# Patient Record
Sex: MALE | Race: NATIVE HAWAIIAN OR OTHER PACIFIC ISLANDER
[De-identification: names, ages, dates, MRNs, and addresses within clinical notes are randomized per-mention and may not be internally consistent; named-entity substitution may affect disease eponyms.]

---

## 2018-11-29 ENCOUNTER — HOSPITAL ENCOUNTER (EMERGENCY)
Dept: HOSPITAL 31 - C.ER | Age: 33
LOS: 1 days | Discharge: HOME | End: 2018-11-30
Payer: SELF-PAY

## 2018-11-29 VITALS — TEMPERATURE: 98.3 F

## 2018-11-29 VITALS — RESPIRATION RATE: 18 BRPM

## 2018-11-29 DIAGNOSIS — H66.92: Primary | ICD-10-CM

## 2018-11-29 DIAGNOSIS — I10: ICD-10-CM

## 2018-11-30 VITALS — SYSTOLIC BLOOD PRESSURE: 201 MMHG | DIASTOLIC BLOOD PRESSURE: 129 MMHG | HEART RATE: 85 BPM

## 2018-11-30 VITALS — OXYGEN SATURATION: 100 %

## 2018-11-30 NOTE — C.PDOC
History Of Present Illness


33 year old male presents to the ED c/o left ear pain associated with purulent 

discharge after cleaning with a Q-tip x 1 week. Patient denies fever, chills, 

headache, dizziness, nausea, vomit, diarrhea, injury, fall, trauma.


Time Seen by Provider: 11/29/18 23:13


Chief Complaint (Nursing): ENT Problem


History Per: Patient


History/Exam Limitations: None


Onset/Duration Of Symptoms: Days


Current Symptoms Are (Timing): Still Present


Quality (Ear): Pain W/Touch, Discharge


Anticoagulant/Antiplatlet Use?: No


Recent Aspirin Use: No





Past Medical History


Reviewed: Historical Data, Nursing Documentation, Vital Signs


Vital Signs: 





                                Last Vital Signs











Temp  98.3 F   11/29/18 23:02


 


Pulse  86   11/30/18 00:36


 


Resp  18   11/30/18 00:36


 


BP  211/129 H  11/30/18 00:36


 


Pulse Ox  100   11/30/18 00:36














- Medical History


PMH: HTN


Surgical History: No Surg Hx


Family History: States: Unknown Family Hx





- Social History


Hx Alcohol Use: No


Hx Substance Use: No





- Immunization History


Hx Tetanus Toxoid Vaccination: No


Hx Influenza Vaccination: No


Hx Pneumococcal Vaccination: No





Review Of Systems


Constitutional: Negative for: Fever, Chills


Eyes: Negative for: Vision Change


ENT: Positive for: Ear Pain, Ear Discharge.  Negative for: Nose Discharge, Nose 

Congestion


Respiratory: Negative for: Cough, Shortness of Breath


Gastrointestinal: Negative for: Nausea, Vomiting


Neurological: Negative for: Headache, Dizziness





Physical Exam





- Physical Exam


Appears: Non-toxic, No Acute Distress


Skin: Normal Color, Warm, Dry


Head: Atraumatic, Normacephalic


Eye(s): bilateral: Normal Inspection


Ear(s): Left: Other (no tragus tenderness, ear canal erythema, TM erythematous 

with exudates), Right: Normal


Oral Mucosa: Moist


Throat: Normal, No Erythema, No Exudate


Neck: Normal ROM, Supple


Chest: Symmetrical


Cardiovascular: Rhythm Regular


Respiratory: Normal Breath Sounds, No Rales, No Rhonchi, No Wheezing


Extremity: Normal ROM, No Tenderness, No Swelling


Neurological/Psych: Oriented x3, Normal Speech, Normal Cognition


Gait: Steady





ED Course And Treatment


O2 Sat by Pulse Oximetry: 100 (ON RA)


Pulse Ox Interpretation: Normal


Progress Note: Plan:  - Augmentin 1 tab PO.  - Clonidine 0.2 mg PO.  - Clonidine

0.1 mg PO.  Patient found to have elevated blood pressure, patient states he is 

non complaint with his medications over the past 7 months. Patient shows now 

signs of HTN emergency/ urgency. Patient was given catapress 0.3 mg PO with 

slight decrease in BP. Pt was informed that BP is moderately high and needs to 

resume BP meds or to take prescribed norvasc until seen by PMD and PMD follow up

tomorrow.  The importance of compliance to meds and PMD visit was stressed to pt

who expressed understanding of the risks of coronary compliation or stroke if 

HTN is left untreated.


Reassessment Condition: Improved





Disposition





- Disposition


Referrals: 


[facility] [Outside]


Behin,Babak, MD [Staff Provider] - 


Disposition: HOME/ ROUTINE


Disposition Time: 01:51


Condition: STABLE


Additional Instructions: 


Please take all meds as directed





Please walk in to medical clinic tomorrow or PMD office for BP reevaluation and 

management





Follw up with ENT





Return to ER if headache, weakness, decrease vision, vomiting, chest pain or 

worse 


Prescriptions: 


amLODIPine [Norvasc] 10 mg PO DAILY #20 tab


Amoxicillin/Clavulanate [Augmentin 875 MG-125 MG] 1 tab PO BID #14 tab


Instructions:  Ear Infections (Otitis Media), High Blood Pressure (DC)


Forms:  [facility] (English)





- Clinical Impression


Clinical Impression: 


 Otitis media, Asymptomatic hypertension








- PA / NP / Resident Statement


MD/DO has reviewed & agrees with the documentation as recorded.





- Scribe Statement


The provider has reviewed the documentation as recorded by the Scribe


Fernando Marcus





All medical record entries made by the Scribvee were at my direction and 

personally dictated by me. I have reviewed the chart and agree that the record 

accurately reflects my personal performance of the history, physical exam, 

medical decision making, and the department course for this patient. I have also

personally directed, reviewed, and agree with the discharge instructions and 

disposition.